# Patient Record
Sex: MALE | Race: WHITE | NOT HISPANIC OR LATINO | Employment: UNEMPLOYED | URBAN - METROPOLITAN AREA
[De-identification: names, ages, dates, MRNs, and addresses within clinical notes are randomized per-mention and may not be internally consistent; named-entity substitution may affect disease eponyms.]

---

## 2023-06-28 PROBLEM — F41.9 ANXIETY: Status: ACTIVE | Noted: 2022-03-22

## 2023-06-28 RX ORDER — BUPROPION HYDROCHLORIDE 150 MG/1
TABLET ORAL
COMMUNITY
Start: 2022-03-22 | End: 2023-08-13

## 2023-06-29 ENCOUNTER — OFFICE VISIT (OUTPATIENT)
Dept: FAMILY MEDICINE | Facility: CLINIC | Age: 33
End: 2023-06-29
Payer: COMMERCIAL

## 2023-06-29 VITALS
SYSTOLIC BLOOD PRESSURE: 129 MMHG | WEIGHT: 236.2 LBS | OXYGEN SATURATION: 97 % | DIASTOLIC BLOOD PRESSURE: 81 MMHG | BODY MASS INDEX: 37.96 KG/M2 | HEIGHT: 66 IN | TEMPERATURE: 98.5 F | HEART RATE: 90 BPM

## 2023-06-29 DIAGNOSIS — R41.840 INATTENTION: ICD-10-CM

## 2023-06-29 DIAGNOSIS — Z76.89 ENCOUNTER TO ESTABLISH CARE: ICD-10-CM

## 2023-06-29 DIAGNOSIS — F41.9 ANXIETY: ICD-10-CM

## 2023-06-29 DIAGNOSIS — F40.10 SOCIAL ANXIETY DISORDER: Primary | ICD-10-CM

## 2023-06-29 PROCEDURE — 99204 OFFICE O/P NEW MOD 45 MIN: CPT | Mod: GC | Performed by: STUDENT IN AN ORGANIZED HEALTH CARE EDUCATION/TRAINING PROGRAM

## 2023-06-29 RX ORDER — SERTRALINE HYDROCHLORIDE 100 MG/1
100 TABLET, FILM COATED ORAL DAILY
Qty: 90 TABLET | Refills: 3 | Status: SHIPPED | OUTPATIENT
Start: 2023-06-29 | End: 2024-08-30

## 2023-06-29 ASSESSMENT — ANXIETY QUESTIONNAIRES
3. WORRYING TOO MUCH ABOUT DIFFERENT THINGS: MORE THAN HALF THE DAYS
5. BEING SO RESTLESS THAT IT IS HARD TO SIT STILL: SEVERAL DAYS
GAD7 TOTAL SCORE: 9
GAD7 TOTAL SCORE: 9
6. BECOMING EASILY ANNOYED OR IRRITABLE: SEVERAL DAYS
7. FEELING AFRAID AS IF SOMETHING AWFUL MIGHT HAPPEN: SEVERAL DAYS
1. FEELING NERVOUS, ANXIOUS, OR ON EDGE: MORE THAN HALF THE DAYS
2. NOT BEING ABLE TO STOP OR CONTROL WORRYING: SEVERAL DAYS
IF YOU CHECKED OFF ANY PROBLEMS ON THIS QUESTIONNAIRE, HOW DIFFICULT HAVE THESE PROBLEMS MADE IT FOR YOU TO DO YOUR WORK, TAKE CARE OF THINGS AT HOME, OR GET ALONG WITH OTHER PEOPLE: SOMEWHAT DIFFICULT

## 2023-06-29 ASSESSMENT — PATIENT HEALTH QUESTIONNAIRE - PHQ9
SUM OF ALL RESPONSES TO PHQ QUESTIONS 1-9: 10
5. POOR APPETITE OR OVEREATING: SEVERAL DAYS

## 2023-06-29 NOTE — PATIENT INSTRUCTIONS
Nice to meet you today, Jesus!    Here's our plan:   - increase sertraline to 100 mg   - continue weekly therapy  - continue wellbutrin  - see info below for neuropsych testing options  - mychart check in in 2 moths  - return in 2-3 months for check in and physical    NEUROPSYCHIATRY REFERRAL    [facility]  Phone: [phone]  Fax:[phone]  (Fax referral, cover sheet, 6 months of progress notes, and insurance information, then call the register patient)   [address] [place] [address] [id number] [place] [address] Psychology & Wellness, [facility]  [address]  [place] [address]  [phone]    [address] [place] [address] [address]  [place] [address]  [phone]      [facility]*  [facility]  [address], [address]  [place] [address]  Phone: [phone]  Fax: [phone]  *They require physician referral, which can be faxed to [phone].    [facility]  [address]  [place] [address]  Phone: [phone]   Fax: [phone]    [facility]**  [address] [place] [address]  Phone: [phone]  Fax: [phone]  **They require physician referral, which can be faxed to [phone]    Forensic Neuropsychology Assessment Services:    Isamar Sunshine, Ph.D.   [facility]   [phone]     John Welch, Ph.D.   [phone]     Yolanda Genao, PhD   [phone]     Aviva Agrawal, PhD   [phone]     Kim Salcedo, Ph.D.   [phone]

## 2023-06-29 NOTE — PROGRESS NOTES
Assessment & Plan     Encounter to establish care  Social anxiety disorder  Anxiety  Inattention  33-year-old presents to establish care, get follow-up for social anxiety disorder and concerns for inattentive symptoms.  They previously were not insured and had sought care from an online mental health provider that required $100/mo subscription.  They have been doing okay on Wellbutrin 150 mg daily and sertraline 50 mg daily.  In weekly therapy which has been very helpful.  He continues to experience significant social anxiety that has been prohibitive to their social wellbeing.  Open to medication change today, decided to prioritize optimization of sertraline.  Could consider increasing bupropion, but would be concerned about this worsening anxiety.  Unclear at this point if patient is likely to receive diagnosis of ADHD; I suspect based on what they shared that they would not, and therefore bupropion may not be a necessary medication for them.  No safety concerns today.  No history of SI/SIB.  PLAN:   - Refill buPROPion (WELLBUTRIN XL) 150 MG 24 hr tablet  - INCREASE sertraline (ZOLOFT) 100 MG tablet; Take 1 tablet (100 mg) by mouth daily  -Continue therapy  -Neuropsychiatric testing resources provided in after visit summary\  -MyChart check in in 2 weeks to assess medication change  -Follow-up in 3 months, sooner if needed    Ange Atkins MD  [facility] VICTORINO Lee is a 33 year old, presenting for the following health issues:  Establish Care (Pt is here to establish care.) and Mental Health Problem (Pt is here to discuss about the resources available for mental health. Pt reports seeing a therapist online and reports it helps a lot.)        6/29/2023     9:40 AM   Additional Questions   Roomed by khoa soto   Accompanied by self         6/29/2023     9:40 AM   Patient Reported Additional Medications   Patient reports taking the following new medications  "Zoloft and wellbutrin     HPI     Mental health concerns    Has been seeing online therapist for over a year - weekly for an hour    Feels like he can only do so much since it's online  $100 a month for subscription to [facility]. Started when on insurance.  You see a prescriber and a therapist. $15-30 min session once a month    Meds that he's on - seem to work well    Also wants to get tested for ADHD  Talks to therapist about it     Partner, her wife think he has ADHD  Does not recall there being a concern for ADHD as a child  Does not think parents wanted him tested for ADHD.   Gets distracted easily, anxiety comes in to play  Can focus on work very well  Outside of that, feels hard to focus  Like cleaning the house    Feels very worried about  Mental health generally  Bad social anxiety  Avoids going out with friends  Getting a lot better    Light sensitivity  Hearing sensitivity    No SI, SIB. Feels safe.    Sertraline - just got a refill a few weeks ago    STI testing  - no testing needed  - partners have been stable  - poly -- has partner who has wife        6/29/2023    11:19 AM   PHQ   PHQ-9 Total Score 10   Q9: Thoughts of better off dead/self-harm past 2 weeks Not at all         6/29/2023    11:19 AM   Last PHQ-9   1.  Little interest or pleasure in doing things 1   2.  Feeling down, depressed, or hopeless 0   3.  Trouble falling or staying asleep, or sleeping too much 2   4.  Feeling tired or having little energy 0   5.  Poor appetite or overeating 1   6.  Feeling bad about yourself 2   7.  Trouble concentrating 3   8.  Moving slowly or restless 1   Q9: Thoughts of better off dead/self-harm past 2 weeks 0   PHQ-9 Total Score 10   Difficulty at work, home, or with people Somewhat difficult           6/29/2023    11:19 AM   JAMIE-7 SCORE   Total Score 9             Objective    /81   Pulse 90   Temp 98.5  F (36.9  C) (Oral)   Ht 1.676 m (5' 5.98\")   Wt 107.1 kg (236 lb 3.2 oz)   SpO2 97%   BMI " 38.14 kg/m    Body mass index is 38.14 kg/m .  Physical Exam   GENERAL: Healthy, alert and no distress  EYES: Eyes grossly normal to inspection.  No discharge or erythema, or obvious scleral/conjunctival abnormalities.  RESP: No audible wheeze, cough, or visible cyanosis.  No visible retractions or increased work of breathing.    SKIN: Visible skin clear. No significant rash, abnormal pigmentation or lesions.  NEURO: Cranial nerves grossly intact.  Mentation and speech appropriate for age.  PSYCH: Mentation appears normal, affect normal/bright, judgement and insight intact, normal speech and appearance well-groomed.      ----- Service Performed and Documented by Resident or Fellow ------

## 2023-06-30 PROBLEM — R41.840 INATTENTION: Status: ACTIVE | Noted: 2023-06-30

## 2023-08-13 ENCOUNTER — HEALTH MAINTENANCE LETTER (OUTPATIENT)
Age: 33
End: 2023-08-13

## 2024-08-30 DIAGNOSIS — F40.10 SOCIAL ANXIETY DISORDER: ICD-10-CM

## 2024-08-30 RX ORDER — SERTRALINE HYDROCHLORIDE 100 MG/1
100 TABLET, FILM COATED ORAL DAILY
Qty: 30 TABLET | Refills: 0 | Status: SHIPPED | OUTPATIENT
Start: 2024-08-30

## 2024-08-30 NOTE — TELEPHONE ENCOUNTER
"Request for medication refill:    sertraline (ZOLOFT) 100 MG tablet     Providers if patient needs an appointment and you are willing to give a one month supply please refill for one month and  send a letter/MyChart using \".SMILLIMITEDREFILL\" .smillimited and route chart to \"P [facility] \" (Giving one month refill in non controlled medications is strongly recommended before denial)    If refill has been denied, meaning absolutely no refills without visit, please complete the smart phrase \".smirxrefuse\" and route it to the \"P [facility] MED REFILLS\"  pool to inform the patient and the pharmacy.    Esha Foster MA     "

## 2024-08-30 NOTE — TELEPHONE ENCOUNTER
Due for visit. Last 6/2023. Limited refill provided. MyChart sent to patient to schedule appointment.     Doreen Mahoney MD  [facility], PGY-3

## 2024-10-06 ENCOUNTER — HEALTH MAINTENANCE LETTER (OUTPATIENT)
Age: 34
End: 2024-10-06

## 2024-11-20 DIAGNOSIS — F40.10 SOCIAL ANXIETY DISORDER: ICD-10-CM

## 2024-11-20 NOTE — TELEPHONE ENCOUNTER
"Request for medication refill:  sertraline (ZOLOFT) 100 MG tablet        Providers if patient needs an appointment and you are willing to give a one month supply please refill for one month and  send a letter/MyChart using \".SMILLIMITEDREFILL\" .smillimited and route chart to \"P [facility] \" (Giving one month refill in non controlled medications is strongly recommended before denial)    If refill has been denied, meaning absolutely no refills without visit, please complete the smart phrase \".smirxrefuse\" and route it to the \"P [facility] MED REFILLS\"  pool to inform the patient and the pharmacy.    Jennifer Mckinney, [facility]      "

## 2024-11-21 RX ORDER — SERTRALINE HYDROCHLORIDE 100 MG/1
100 TABLET, FILM COATED ORAL DAILY
Qty: 30 TABLET | Refills: 0 | Status: SHIPPED | OUTPATIENT
Start: 2024-11-21

## 2025-01-13 ENCOUNTER — TELEPHONE (OUTPATIENT)
Dept: FAMILY MEDICINE | Facility: CLINIC | Age: 35
End: 2025-01-13
Payer: COMMERCIAL

## 2025-01-13 NOTE — TELEPHONE ENCOUNTER
CC attempted to reach patient to schedule an appointment. LVM with direct number for a call back. Also sent MyChart.    Fuad Bradley  Care Coordinator- [facility]  [phone]

## 2025-01-14 NOTE — TELEPHONE ENCOUNTER
CC made final attempt to reach patient. Patient did not .     CC sending letter.     Fuad Bradley  Care Coordinator- [facility]  [phone]

## 2025-03-24 ENCOUNTER — VIRTUAL VISIT (OUTPATIENT)
Dept: FAMILY MEDICINE | Facility: CLINIC | Age: 35
End: 2025-03-24

## 2025-03-24 DIAGNOSIS — F41.9 ANXIETY: ICD-10-CM

## 2025-03-24 DIAGNOSIS — F40.10 SOCIAL ANXIETY DISORDER: Primary | ICD-10-CM

## 2025-03-24 DIAGNOSIS — R41.840 INATTENTION: ICD-10-CM

## 2025-03-24 RX ORDER — PROPRANOLOL HYDROCHLORIDE 10 MG/1
10 TABLET ORAL 3 TIMES DAILY PRN
Qty: 30 TABLET | Refills: 0 | Status: SHIPPED | OUTPATIENT
Start: 2025-03-24

## 2025-03-24 RX ORDER — SERTRALINE HYDROCHLORIDE 100 MG/1
100 TABLET, FILM COATED ORAL DAILY
Qty: 90 TABLET | Refills: 3 | Status: SHIPPED | OUTPATIENT
Start: 2025-03-24

## 2025-03-24 NOTE — PROGRESS NOTES
Preceptor Attestation:  Patient seen and evaluated over video. I discussed the patient with the resident. I have verified the content of the note, which accurately reflects my assessment of the patient and the plan of care.   Supervising Physician:  Conchita Tidwell DO

## 2025-03-24 NOTE — PATIENT INSTRUCTIONS
Patient Education   Here is the plan from today's visit    CONTINUE ZOLOFT  START PROPRANOLOL as NEEDED FOR SOCIAL ANXIETY  COME BACK FOR RECHECK         Adult Attention Deficit Hyperactivity Disorder    [facility]  [address] , [address], [place], [place], [address]  [phone]  [email or web address]    [facility]  Several Locations  [phone])  [email or web address]    [facility]  [address]  [address]  [place] [address]  [phone]  [email or web address]    [facility]  Several Locations.  [phone]  [email or web address]    [facility]  [address]  [place], [place] [address] [phone]  [email or web address]      Providers can also consider primary care based assessment using the following SmartPhrases/tools if community waits are too long or difficult to access: BTADHDADULTINITIALEVAL. Let Dr. Garza know if there are any questions about those resources.         Please call or return to clinic if your symptoms don't go away.    Follow up plan  No follow-ups on file.    Thank you for coming to [facility] today.  Lab Testing:  **If you had lab testing today and your results are reassuring or normal they will be mailed to you or sent through [employer] within 7 days.   **If the lab tests need quick action we will call you with the results.  **If you are having labs done on a different day, please call [phone] to schedule at [facility] or [phone] for other [facility] locations. Labs do not offer walk-in appointments.  The phone number we will call with results is # [phone] (home) . If this is not the best number please call our clinic and change the number.  Medication Refills:  If you need any refills please call your  pharmacy and they will contact us.   If you need to  your refill at a new pharmacy, please contact the new pharmacy directly. The new pharmacy will help you get your medications transferred faster.   Scheduling:  If you have any concerns about today's visit or wish to schedule another appointment please call our office during normal business hours [phone] (8-5:00 M-F). If you can no longer make a scheduled visit, please cancel via [employer] or call us to cancel.   If a referral was made to an [facility] specialty provider and you do not get a call from central scheduling, please refer to directions on your visit summary or call our office during normal business hours for assistance.   If a Mammogram was ordered for you at the [facility] Center call [phone] to schedule or change your appointment.  If you had an XRay/CT/Ultrasound/MRI ordered the number is [phone] to schedule or change your radiology appointment.   [facility] has limited ultrasound appointments available on Wednesdays, if you would like your ultrasound at [facility], please call [phone] to schedule.   Medical Concerns:  If you have urgent medical concerns please call [phone] at any time of the day.    Doreen Mahoney MD

## 2025-03-24 NOTE — PROGRESS NOTES
Jesus is a 35 year old who is being evaluated via a billable video visit.      Assessment & Plan     Social anxiety disorder  Symptoms overall improved with selective serotonin reuptake inhibitor. However, continues to have significant social anxiety limiting ability to engage with community. Discussed options including increasing Zoloft dose vs trialing PRN. Patient preferred to trial beta blocker. Otherwise, engaged in therapy. Denies SI/HI. No acute safety concerns.   - START Propranolol 10 mg TID PRN for social anxiety   - Continue Zoloft 100 mg daily   - Continue therapy   - Return in 4 weeks for recheck; would benefit from in-person visit to check vitals if using Propranolol     Inattention  Reports concern for ADHD. Provided resources for further testing.   - Neuropsychiatric testing resources provided in after visit summary     Subjective   Jesus is a 35 year old, presenting for the following health issues:  Recheck Medication (Follow up)    HPI      Mood:  - Current regimen: Zoloft 100 mg daily   - Stopped Wellbutrin and did better without it   - Engaged in therapy.   - Overall mood has been better. Mood little down lately, has ups and downs. Depression feels better. Anxiety still high.   - Mostly can still do the things he wants to do. Still has a lot of social anxiety. More situational related to social anxiety.   - Thinks he has ADHD. Very distracted, sometimes forgets to eat, forgets why he went into the room. Partner thinks he has ADHD. Ongoing since he was a child, never got testing, never was on medications, parents never pursued.   - Uses marijuana and mushrooms ocassionally. No other substance use.             Objective           Vitals:  No vitals were obtained today due to virtual visit.    Physical Exam   GENERAL: alert and no distress  EYES: Eyes grossly normal to inspection.  No discharge or erythema, or obvious scleral/conjunctival abnormalities.  RESP: No audible wheeze, cough, or  visible cyanosis.    SKIN: Visible skin clear. No significant rash, abnormal pigmentation or lesions.  NEURO: Cranial nerves grossly intact.  Mentation and speech appropriate for age.  PSYCH: Appropriate affect, tone, and pace of words        Video-Visit Details    Type of service:  Video Visit   Originating Location (pt. Location): Home  Distant Location (provider location):  On-site  Platform used for Video Visit: Ling  Signed Electronically by: Doreen Mahoney MD

## 2025-07-03 DIAGNOSIS — F40.10 SOCIAL ANXIETY DISORDER: ICD-10-CM

## 2025-07-03 NOTE — TELEPHONE ENCOUNTER
"Request for medication refill:    Medication Name:   Propranolol 10mg tablet  Providers if patient needs an appointment and you are willing to give a one month supply please refill for one month and  send a MyChart using \".SMILLIMITEDREFILL\" .Or route chart to \"P [facility] \" . And use the phrase \" SMIRXFOLLOWUP\"To call patient and inform of limited refill and providers request to make an appointment. (Giving one month refill in non controlled medications is strongly recommended before denial)    If refill has been denied, meaning absolutely no refills without visit, please complete the smart phrase \".SMIRXREFUSE\" and route it to the \"P [facility] MED REFILLS\"  pool to inform the patient and the pharmacy.    Camilo Butler MA     "

## 2025-07-08 RX ORDER — PROPRANOLOL HYDROCHLORIDE 10 MG/1
10 TABLET ORAL 3 TIMES DAILY PRN
Qty: 30 TABLET | Refills: 0 | Status: SHIPPED | OUTPATIENT
Start: 2025-07-08